# Patient Record
Sex: MALE | URBAN - METROPOLITAN AREA
[De-identification: names, ages, dates, MRNs, and addresses within clinical notes are randomized per-mention and may not be internally consistent; named-entity substitution may affect disease eponyms.]

---

## 2019-11-13 ENCOUNTER — HISTORICAL (OUTPATIENT)
Dept: ANESTHESIOLOGY | Facility: HOSPITAL | Age: 44
End: 2019-11-13

## 2020-01-24 ENCOUNTER — HISTORICAL (OUTPATIENT)
Dept: ADMINISTRATIVE | Facility: HOSPITAL | Age: 45
End: 2020-01-24

## 2022-04-09 ENCOUNTER — HISTORICAL (OUTPATIENT)
Dept: ADMINISTRATIVE | Facility: HOSPITAL | Age: 47
End: 2022-04-09

## 2022-04-25 VITALS
BODY MASS INDEX: 36.75 KG/M2 | HEIGHT: 67 IN | WEIGHT: 234.13 LBS | OXYGEN SATURATION: 96 % | DIASTOLIC BLOOD PRESSURE: 91 MMHG | SYSTOLIC BLOOD PRESSURE: 148 MMHG

## 2022-04-30 NOTE — OP NOTE
PREOPERATIVE DIAGNOSIS:  Left knee cartilage failure.    POSTOPERATIVE DIAGNOSIS:  Left knee cartilage failure.    PROCEDURE:  Arthroscopy, partial medial meniscectomy.    DESCRIPTION OF PROCEDURE:  Patient was brought to the OR, given IV antibiotics, general anesthesia.  Left knee was examined.  He had no instability.  He had a moderate effusion.  He was prepped and draped.  Inflow was established.  Knee was examined arthroscopically.  He had chondromalacia of the patella, grade 2-3 changes there.  He had a medial meniscal tear.  His medial meniscus was shaved to a stable rim.  His lateral joint line revealed no acute advanced changes.  Following medial meniscal debridement, the knee was thoroughly irrigated.  Portals closed with nylon.  Sterile dressing applied.  No complications.        MR/MODL   DD: 12/10/2019 1937   DT: 12/10/2019 1949  Job # [id number]

## 2022-05-02 NOTE — HISTORICAL [facility] CERNER
This is a historical note converted from [employer]. Formatting and pictures may have been removed.  Please reference [employer] for original formatting and attached multimedia. Chief Complaint  lower back pain after fall at home x6 days ago  History of Present Illness  44-year-old white male presents to clinic complaining of lower back pain.? Patient states approximately 6 days ago he was stepping from?the tailgate of his truck onto a trailer his foot slipped?he fell back?onto his right elbow?right side of his back.? States he is having low back pain since the fall.? It is in the midline.? Denies any radiation of the pain into the legs. ?Denies any weakness numbness tingling in the legs. ?Denies any bladder or bowel dysfunction. ?Denies any head injury or loss of consciousness.  Review of Systems  Constitutional: negative except as stated in HPI  Eye: negative except as stated in HPI  ENT: negative except as stated in HPI  Respiratory: negative except as stated in HPI  Cardiovascular: negative except as stated in HPI  Gastrointestinal: negative except as stated in HPI  Genitourinary: negative except as stated in HPI  Hema/Lymph: negative except as stated in HPI  Endocrine: negative except as stated in HPI  Immunologic: negative except as stated in HPI  Musculoskeletal: negative except as stated in HPI  Integumentary: negative except as stated in HPI  Neurologic: negative except as stated in HPI  All Other ROS_ negative except as stated in HPI  Physical Exam  Vitals & Measurements  T:?36.7? ?C (Oral)? HR:?76(Peripheral)? RR:?18? BP:?148/91? SpO2:?96%?  HT:?170?cm? WT:?106.2?kg? BMI:?36.75?  General_well-developed well-nourished in no acute distress  Musculoskeletal_tenderness to palpation over the lumbar spine.  Integumentary_no rashes or skin lesions present  Neurologic_ cranial nerves intact, no signs of peripheral neurological deficit, motor/sensory function intact 5 out of 5 strength in the bilateral lower  extremities.  ?  Assessment/Plan  1.?Low back pain?M54.5  ?Start the anti-inflammatory tomorrow morning. ?Take it with food. ?Do not take any Advil Aleve Motrin or ibuprofen with it.? Muscle relaxer may make you drowsy do not drink alcohol or drive or operate heavy machinery if you take it.? Apply heat?to the?muscle spasms?of lower back 3-4 times a day for 10 to 15 minutes. ?Apply ice to the areas of bruising and contusion.? If your symptoms persist or worsen seek medical attention immediately  Ordered:  cyclobenzaprine, 10 mg = 1 tab(s), Oral, TID, Use as needed for muscle spasm/pain, may cause sedation, X 10 day(s), # 30 tab(s), 0 Refill(s), Pharmacy: [place], [place], 170, cm, Height/Length Dosing, 01/24/20 13:16:00 CST, 106.2, kg, Weight Dosing, 01/2...  diclofenac, 75 mg = 1 tab(s), Oral, BID, # 14 tab(s), 0 Refill(s), Pharmacy: [place], [place], 170, cm, Height/Length Dosing, 01/24/20 13:16:00 CST, 106.2, kg, Weight Dosing, 01/24/20 13:16:00 CST  ?   Problem List/Past Medical History  Ongoing  Obesity  Historical  No qualifying data  Procedure/Surgical History  Arthroscopy Knee W/Menisectomy (Left) (11/13/2019)  Arthroscopy, knee, surgical; with meniscectomy (medial OR lateral, including any meniscal shaving) including debridement/shaving of articular cartilage (chondroplasty), same or separate compartment(s), when performed (11/13/2019)  Excision of Left Knee Joint, Percutaneous Endoscopic Approach (11/13/2019)  Vasectomy  [place] tooth   Medications  cyclobenzaprine 10 mg oral tablet, 10 mg= 1 tab(s), Oral, TID  diclofenac sodium 75 mg oral delayed release tablet, 75 mg= 1 tab(s), Oral, BID  Fish Oil oral capsule, 1 cap(s), Oral, qAM  Glucosamine Chondroitin, 1 tab(s), Oral, qAM  magnesium gluconate, 1 tab(s), Oral, qAM  Allergies  No Known Allergies  Social History  Abuse/Neglect  No, 01/24/2020  No, No, Yes, 11/13/2019  Alcohol  Never, 11/07/2019  Employment/School  Employed,  [employer], 11/07/2019  Home/Environment  Lives with Spouse, Daughter., 11/07/2019  Sexual  Sexual orientation: Straight or heterosexual., 11/07/2019  Spiritual/Cultural  [place], 11/07/2019  Substance Use  Never, 11/07/2019  Tobacco  Never (less than 100 in lifetime), N/A, 01/24/2020  Family History  Aneurysm: Father.  Mother: History is negative  Health Maintenance  Health Maintenance  ???Pending?(in the next year)  ??? ??Due?  ??? ? ? ?Alcohol Misuse Screening due??01/01/20??and every 1??year(s)  ??? ? ? ?ADL Screening due??01/24/20??and every 1??year(s)  ??? ? ? ?Tetanus Vaccine due??01/24/20??and every 10??year(s)  ??? ??Due In Future?  ??? ? ? ?Obesity Screening not due until??01/01/21??and every 1??year(s)  ???Satisfied?(in the past 1 year)  ??? ??Satisfied?  ??? ? ? ?Blood Pressure Screening on??01/24/20.??Satisfied by Andreina Hood  ??? ? ? ?Body Mass Index Check on??01/24/20.??Satisfied by Andreina Hood  ??? ? ? ?Influenza Vaccine on??11/07/19.??Satisfied by Jammie Jimenez LPN  ??? ? ? ?Obesity Screening on??01/24/20.??Satisfied by Andreina Hood  ?  Diagnostic Results  Negative for fracture.